# Patient Record
(demographics unavailable — no encounter records)

---

## 2024-10-11 NOTE — END OF VISIT
[FreeTextEntry3] :   I, Colette Tawanda, acted as a scribe on behalf of Dr. Adia Duncan M.D  on 10/09/2024.   All medical entries made by the scribe were at my, Dr. Adia Duncan M.D ,  direction and personally dictated by me on 10/09/2024. I have reviewed the chart and agree that the record accurately reflects my personal performance of the history, physical exam, assessment and plan. I have also personally directed, reviewed, and agreed with the chart.

## 2024-10-11 NOTE — PHYSICAL EXAM
[Appropriately responsive] : appropriately responsive [Alert] : alert [No Acute Distress] : no acute distress [Soft] : soft [Non-tender] : non-tender [Non-distended] : non-distended [No HSM] : No HSM [No Lesions] : no lesions [No Mass] : no mass [Oriented x3] : oriented x3 [Examination Of The Breasts] : a normal appearance [No Masses] : no breast masses were palpable [Labia Majora] : normal [Labia Minora] : normal [Normal] : normal [Uterine Adnexae] : normal [FreeTextEntry5] : noted cervical polyp

## 2024-10-11 NOTE — END OF VISIT
[FreeTextEntry3] :   I, Colette Tawanda, acted as a scribe on behalf of Dr. Adia Duncan M.D  on 10/09/2024.   All medical entries made by the scribe were at my, Dr. Adia Duncan M.D ,  direction and personally dictated by me on 10/09/2024. I have reviewed the chart and agree that the record accurately reflects my personal performance of the history, physical exam, assessment and plan. I have also personally directed, reviewed, and agreed with the chart.  no

## 2024-10-11 NOTE — HISTORY OF PRESENT ILLNESS
[Patient reported mammogram was normal] : Patient reported mammogram was normal [Patient reported breast sonogram was normal] : Patient reported breast sonogram was normal [Patient reported PAP Smear was normal] : Patient reported PAP Smear was normal [Patient reported bone density results were normal] : Patient reported bone density results were normal [FreeTextEntry1] : 72 year female postmenopausal since mid 50s presents as new patient to establish care. Pt is taking estrogen and progesterone. Pt reports migraines and was put on medication recently felt lightheaded, and reports low blood pressure due to the medication. C/o bumps in vaginal area . Reports recent pelvic pressure and pain pt had a water US - wnl for slightly thickened lining   PMH: migraines, asthma  Allergies: NKDA    [Mammogramdate] : 04/2024 [BreastSonogramDate] : 04/2024 [PapSmeardate] : 08/2023 [BoneDensityDate] : 2022

## 2024-10-11 NOTE — PLAN
[FreeTextEntry1] : 72 year  old female  presents for annual visit.   HCM -Pt taking estradiol patch/progesterone pills- will notify for refill - Rx Dexa - Pap/hpv UTD  - Mammo UTD - Colon UTD  Cervical polyp  - Cervical removed w/o complication and sent to path   F/u results, RTO 1yr annual

## 2024-10-11 NOTE — HISTORY OF PRESENT ILLNESS
[Patient reported mammogram was normal] : Patient reported mammogram was normal [Patient reported breast sonogram was normal] : Patient reported breast sonogram was normal [Patient reported PAP Smear was normal] : Patient reported PAP Smear was normal [Patient reported bone density results were normal] : Patient reported bone density results were normal [FreeTextEntry1] : 72 year female postmenopausal since mid 50s presents as new patient to establish care. Pt is taking estrogen and progesterone. Pt reports migraines and was put on medication recently felt lightheaded, and reports low blood pressure due to the medication. C/o bumps in vaginal area . Reports recent pelvic pressure and pain pt had a water US - wnl for slightly thickened lining   PMH: migraines, asthma  Allergies: NKDA    [Mammogramdate] : 04/2024 [PapSmeardate] : 08/2023 [BreastSonogramDate] : 04/2024 [BoneDensityDate] : 2022

## 2025-03-04 NOTE — PHYSICAL EXAM
[TextEntry] : Constitutional: no acute distress, well nourished, well developed and well-appearing. Pulmonary: no respiratory distress, lungs w- occasional mild wheeze, no accessory muscle use. Cardiac: normal rate, with a regular rhythm, normal S1 and S2 and no murmur heard.  Vascular: there was no peripheral edema. Abdomen: abdomen soft, non-tender, non-distended, no abdominal mass palpated and normal bowel sounds. Psychiatric: the affect was normal and insight and judgement were intact

## 2025-03-04 NOTE — HISTORY OF PRESENT ILLNESS
[FreeTextEntry8] : CC: asthma  for past 4-5 days-  has chest tightness, cough- needed albuterol several times day.  no cough, sore throat, fever.  pt has chronic nasal congestion- has allergies.  no sinus tenderness.  1 month ago- while taking classes at the [facility] or at home- she has felt dizzy or nausea.  not vertigo.  dizziness- duration a few sec.  better if she is standing still.  Thinks dizziness occurs when moving from sitting to standing.  no CP or SOB when walking fast on the treadmill- no dizziness or CP or SOB.  [FreeTextEntry1] : chol- pt not sure is she is still taking the statin reviewed 3/12/24 Pulm notes migraines- on verapamil, nortriptyline

## 2025-03-04 NOTE — PLAN
[FreeTextEntry1] : EKG- NSR 60 non fasting- Blood was collected in the office. asthma- add Symbicort.- pt to call if sympt are not improving.  dizziness- rec hydration

## 2025-04-02 NOTE — HISTORY OF PRESENT ILLNESS
[FreeTextEntry1] : CPE [FreeTextEntry8] : vaccine- up to date diet- healthy diet reviewed exercise-gym classes- no CP or SOB reviewed 4/1/25 labs TSH 5.07.   CBC clotted pulling sensation b/l posterior thigh- occasional- no triggers.  recently went on 3 mile hike and had no issues.  resolves after few minutes asthma, dizziness resolved..  chol- compliant w statin reviewed 3/12/24 Pulm notes migraines- on verapamil, nortriptyline skin Ca screening- Daughter is Derm and she gets screened regularly

## 2025-04-02 NOTE — HEALTH RISK ASSESSMENT
[Excellent] : ~his/her~  mood as  excellent [Never (0 pts)] : Never (0 points) [No] : In the past 12 months have you used drugs other than those required for medical reasons? No [No falls in past year] : Patient reported no falls in the past year [0] : 2) Feeling down, depressed, or hopeless: Not at all (0) [PHQ-2 Negative - No further assessment needed] : PHQ-2 Negative - No further assessment needed [Never] : Never [Patient reported mammogram was normal] : Patient reported mammogram was normal [Patient reported PAP Smear was normal] : Patient reported PAP Smear was normal [Patient reported colonoscopy was normal] : Patient reported colonoscopy was normal [HIV test declined] : HIV test declined [Hepatitis C test declined] : Hepatitis C test declined [None] : None [With Family] : lives with family [Employed] : employed [] :  [Sexually Active] : sexually active [Feels Safe at Home] : Feels safe at home [Fully functional (bathing, dressing, toileting, transferring, walking, feeding)] : Fully functional (bathing, dressing, toileting, transferring, walking, feeding) [Fully functional (using the telephone, shopping, preparing meals, housekeeping, doing laundry, using] : Fully functional and needs no help or supervision to perform IADLs (using the telephone, shopping, preparing meals, housekeeping, doing laundry, using transportation, managing medications and managing finances) [Designated Healthcare Proxy] : Designated healthcare proxy [Relationship: ___] : Relationship: [unfilled] [Audit-CScore] : 0 [[id number]] : 0 [Change in mental status noted] : No change in mental status noted [High Risk Behavior] : no high risk behavior [Reports changes in hearing] : Reports no changes in hearing [MammogramDate] : 04/25 [MammogramComments] : as per pt.  done by Radiologist- had breast exam - sees GYN - yearly- 10/2024 [PapSmearDate] : 10/24 [PapSmearComments] : as per pt. - See Dr. Ebonie Sloan [BoneDensityDate] : 03/22 [ColonoscopyDate] : 05/24 [ColonoscopyComments] : - rpt 5 yr [de-identified] :  [de-identified] : [employer] Estate [de-identified] : seen Dr. Cannon- 2025 [de-identified] : seen dentist- every 6 months- has f/u appt [AdvancecareDate] : 04/25

## 2025-04-02 NOTE — PLAN
[FreeTextEntry1] : leg cramps- stretching and hydration prior to exercises.  if not better , then try Co Q 10  .  if still not better, pt can try stopping statin and call me .  Hypothyroidism- incr levothyroxine

## 2025-04-02 NOTE — PHYSICAL EXAM
[No Acute Distress] : no acute distress [Well Nourished] : well nourished [Well Developed] : well developed [Well-Appearing] : well-appearing [No Lymphadenopathy] : no lymphadenopathy [Supple] : supple [No Respiratory Distress] : no respiratory distress  [No Accessory Muscle Use] : no accessory muscle use [Clear to Auscultation] : lungs were clear to auscultation bilaterally [Normal Rate] : normal rate  [Regular Rhythm] : with a regular rhythm [Normal S1, S2] : normal S1 and S2 [No Murmur] : no murmur heard [No Carotid Bruits] : no carotid bruits [No Edema] : there was no peripheral edema [Soft] : abdomen soft [Non Tender] : non-tender [Non-distended] : non-distended [No Masses] : no abdominal mass palpated [Normal Bowel Sounds] : normal bowel sounds [Normal Supraclavicular Nodes] : no supraclavicular lymphadenopathy [Normal Axillary Nodes] : no axillary lymphadenopathy [Normal Posterior Cervical Nodes] : no posterior cervical lymphadenopathy [Normal Anterior Cervical Nodes] : no anterior cervical lymphadenopathy [Normal Inguinal Nodes] : no inguinal lymphadenopathy [Grossly Normal Strength/Tone] : grossly normal strength/tone [No Focal Deficits] : no focal deficits [Normal Gait] : normal gait [Normal Affect] : the affect was normal [Normal Insight/Judgement] : insight and judgment were intact [de-identified] : enlg thyroid

## 2025-04-29 NOTE — DISCUSSION/SUMMARY
[EKG obtained to assist in diagnosis and management of assessed problem(s)] : EKG obtained to assist in diagnosis and management of assessed problem(s) [TextEntry] : Dizziness-episodes are very brief and self-limited and probably secondary to dehydration in the setting of vigorous aerobic activity.  At this point, no evidence of pathology and no need for further workup unless symptoms become more severe.  Return visit 1 year or sooner if problems develop.   Total time of the encounter: 30 minutes which included but was not limited to the following: Face-to-face and non face-to-face time personally spent by the physician preparing to see the patient, obtaining and/or resuming separately obtained history, performing a medically appropriate examination and/or evaluation, counseling and educating the patient/family/caregiver, ordering medications, tests or procedures, referring and communicating with other healthcare professionals, documenting clinical information in the electronic health record, independently interpreting results and communicated results to the patient/family/caregiver and care coordination.

## 2025-04-29 NOTE — PHYSICAL EXAM
[TextEntry] : General/Constitutional: WD/ WN in NAD H: NC/AT Eyes:  PERRL, sclerae and conjunctivae normal without jaundice or xanthelasma; ENMT:normal teeth, gums and palate with no petechiae, pallor or cyanosis Neck: w/o JVD, thyromegaly or adenopathy; normal venous contour Respiratory: clear to auscultation, normal respiratory effort with no retractions or use of accessory respiratory muscles Heart: [id number], regular rate, normal S1, S2 without murmurs, rubs, gallops, heaves or thrills Vascular exam: normal carotid upstrokes without carotid or abdominal bruits. 2+/2+ pulses to posterior tibialis and dorsalis pedis Abdomen: soft, nontender, bowels sounds normal without hepatomegaly or splenomegaly, masses or bruits Musculoskeletal:without significant kyphosis or scoliosis Extremities: w/o CCE, good capillary filling Skin: no stasis changes; no ulcers  Neuro: AA and O x 3; no focal neurologic deficits Psych: normal mood and affect

## 2025-04-29 NOTE — REASON FOR VISIT
[FreeTextEntry1] : The patient is a 73-year-old woman was referred for evaluation of episodes of lightheadedness.  The patient is very physically active with no cardiopulmonary limitations.  She takes an aerobics class on a regular basis during which she does many squats.  On recent occasions, she has had several episodes where she feels lightheaded and has to stop for about 60 seconds after which she is able to continue.  She denies any chest pain or shortness of breath she denies describes occasional episodes of vertigo but these are also very short-lived and self-limiting.

## 2025-05-29 NOTE — HISTORY OF PRESENT ILLNESS
[Pain Location] : pain [] : from right & left buttocks to right & left knee [Lumbar] : lumbar region [___ mths] : [unfilled] month(s) ago [0] : a current pain level of 0/10 [7] : a maximum pain level of 7/10 [Standing] : standing [Daily] : ~He/She~ states the symptoms seem to be occuring daily [Prolonged Standing] : worsened by prolonged standing [Walking] : worsened by walking [de-identified] : Initial consultation for low back pain, patient is a 73-year-old female with initiation of low back pain that started in March 2025 affecting bilateral hamstring pain that does not radiate past the knees. Patient denies of any traumatic injury, and reports that she is very active female with aerobics and weight training 3 times a week. Pain worsens with walking, standing and turning over in bed.

## 2025-05-29 NOTE — REASON FOR VISIT
[Initial Visit] : an initial visit for [Back Pain] : back pain [FreeTextEntry2] : low back and bilateral leg pain

## 2025-05-29 NOTE — DISCUSSION/SUMMARY
[Medication Risks Reviewed] : Medication risks reviewed [Surgical risks reviewed] : Surgical risks reviewed [2 Weeks] : in 2 weeks [de-identified] : Prescription for an MRI of the lumbar spine to evaluate the bilateral leg pain and to rule out nerve impingement at the level of L4-5 instability noted on today's x-rays. Patient is to return in 2 weeks to review the MRI and formulate a plan of care such as physical therapy for core strengthening and or pain management. for epidural steroid injections.  I, Dr. Anish Muhammad, personally performed the evaluation and management (E/M) services for this new patient.  That E/M includes conducting the initial examination, assessing all conditions, and establishing a plan of care.  Today, my ACP, Annie Ann, was here to observe my evaluation and management services for this patient to be followed going forward.

## 2025-05-29 NOTE — PHYSICAL EXAM
[Knee] : patellar 2+ and symmetric bilaterally [Ankle] : ankle 2+ and symmetric bilaterally [Normal] : Oriented to person, place, and time, insight and judgement were intact and the affect was normal [LE] : Sensory: Intact in bilateral lower extremities [de-identified] : Negative FABERs of bilateral hips. Negative palpable tenderness of the greater trochanteric lateral hips. No noted atrophy noted of bilateral LE musculature. [de-identified] : Heel and toe walk is intact. Tension signs of bilateral LEs are negative. [de-identified] : X-rays 4 views of the lumbar spine AP with good alignment of the lumbar spine.   Lateral upright view with noted significant decrease in disc height at L3-4 starting to auto fuse, multilevel degenerative changes.  L4-5 unstable spondylolisthesis. Lateral views of flexion and extension with instability at L4-5.  No bony tumors, no obvious fractures.

## 2025-07-30 NOTE — HISTORY OF PRESENT ILLNESS
[FreeTextEntry1] : 73F presents for evaluation of abnormal thyroid function.  PCP noted TSH elevation of 6 in March 2025 and recommended levothyroxine 25mg , which was increased to 50 mcg April 2025 after reporting a month later of feeling tired. She did not feel tired before taking the medication. Of note Free T4 was 0.8 Jan 2024 and March 2025 Mild TSH elevations since 2016 (highest 7) Currently feels tired, not her norm. Noticed hair thinning, No heat or cold intolerance, palpitations, no dry skin. Chronically mild constipation takes magnesium and prunes. No change in weight.  PSH: trigger finger Breast lesion removed  PMH: Hyperlipidemia - atorva 20mg migraines - verapamil-nortriptyline no DM, no HTN, sometimes has low BP and periods of feeling lightheaded since childhood Takes HRT since menopause in her 50s, managed by GYN  FH: no thyroid history in family father lung cancer brother - GBM ([facility])  SH: no tobacco

## 2025-07-30 NOTE — CONSULT LETTER
[Dear  ___] : Dear  [unfilled], [Courtesy Letter:] : I had the pleasure of seeing your patient, [unfilled], in my office today. [Please see my note below.] : Please see my note below. [Sincerely,] : Sincerely, [FreeTextEntry3] : Rios Minor MD, FACE, CNSC Director of Inpatient Diabetes, [facility] Division of Endocrinology [facility]   Professor of Medicine [employer] at [employer]

## 2025-07-30 NOTE — ASSESSMENT
[FreeTextEntry1] : 73-year-old female presents for initial endocrinology visit for management of hypothyroidism.  1)Acquired  Hypothyroidism This is a recent diagnosis by her PCP.  Patient was noted with TSH of 6 and free T4 of 0.8 was initially started on levothyroxine 25 (March 2025) then dose was increased to 50 mcg daily (April 2025).  Patient is taking medication correctly in the morning on an empty stomach.  Of note patient had mild TSH elevations dating back to 2016.  She reports being asymptomatic until after starting levothyroxine?   No FH of thyroid disorders + fatigue and hair thinning currently Will check updated thyroid function tests at this time as well as TPO antibodies to rule out Hashimoto's.   Check baseline thyroid ultrasound. For now continue with levothyroxine (generic) 50 mcg p.o. daily  2) Hyperlipidemia LDL 96 on atorvastatin 20mg  Follow-up in 3 months

## 2025-07-30 NOTE — PHYSICAL EXAM
[Alert] : alert [Well Nourished] : well nourished [No Acute Distress] : no acute distress [Well Developed] : well developed [Normal Sclera/Conjunctiva] : normal sclera/conjunctiva [EOMI] : extra ocular movement intact [No Proptosis] : no proptosis [Normal Oropharynx] : the oropharynx was normal [Thyroid Not Enlarged] : the thyroid was not enlarged [No Thyroid Nodules] : no palpable thyroid nodules [No Respiratory Distress] : no respiratory distress [No Accessory Muscle Use] : no accessory muscle use [Clear to Auscultation] : lungs were clear to auscultation bilaterally [Normal S1, S2] : normal S1 and S2 [Normal Rate] : heart rate was normal [Regular Rhythm] : with a regular rhythm [No Edema] : no peripheral edema [Pedal Pulses Normal] : the pedal pulses are present [Normal Anterior Cervical Nodes] : no anterior cervical lymphadenopathy [Normal Posterior Cervical Nodes] : no posterior cervical lymphadenopathy [Spine Straight] : spine straight [No Stigmata of Cushings Syndrome] : no stigmata of Cushings Syndrome [Normal Gait] : normal gait [Normal Strength/Tone] : muscle strength and tone were normal [No Rash] : no rash [No Tremors] : no tremors [Oriented x3] : oriented to person, place, and time [Normal Affect] : the affect was normal [Normal Mood] : the mood was normal [Acanthosis Nigricans] : no acanthosis nigricans